# Patient Record
(demographics unavailable — no encounter records)

---

## 2020-11-11 NOTE — NUR
Patients swab is unable to be run as collected yesterday. Patient called to 
recollect COVID specimen.

## 2021-02-17 NOTE — NUR
Pt reports dull ache for past 5 months under her left breast.  Pt reports pain 
is intermittent, but worse over the past 3 days.  Pt states last nigth it 
radiated across her chest.  Pt denies any other symptoms.